# Patient Record
Sex: MALE | Race: BLACK OR AFRICAN AMERICAN | Employment: FULL TIME | URBAN - METROPOLITAN AREA
[De-identification: names, ages, dates, MRNs, and addresses within clinical notes are randomized per-mention and may not be internally consistent; named-entity substitution may affect disease eponyms.]

---

## 2017-11-10 ENCOUNTER — HOSPITAL ENCOUNTER (OUTPATIENT)
Dept: LAB | Age: 52
Discharge: HOME | End: 2017-11-10

## 2017-11-10 PROCEDURE — 88305 TISSUE EXAM BY PATHOLOGIST: CPT | Performed by: INTERNAL MEDICINE

## 2018-08-17 PROBLEM — F17.200 TOBACCO USE DISORDER: Status: ACTIVE | Noted: 2018-08-17

## 2018-08-17 PROBLEM — I25.10 CORONARY ARTERY DISEASE INVOLVING NATIVE CORONARY ARTERY WITHOUT ANGINA PECTORIS: Status: ACTIVE | Noted: 2018-08-17

## 2018-10-17 PROBLEM — F17.200 TOBACCO USE DISORDER: Status: RESOLVED | Noted: 2018-08-17 | Resolved: 2018-10-17

## 2018-10-17 PROBLEM — F17.210 CIGARETTE SMOKER: Status: ACTIVE | Noted: 2018-10-17

## 2019-01-24 ENCOUNTER — HOSPITAL ENCOUNTER (OUTPATIENT)
Dept: ULTRASOUND IMAGING | Age: 54
Discharge: HOME | End: 2019-01-24
Payer: COMMERCIAL

## 2019-01-24 DIAGNOSIS — R22.42 LOCALIZED SWELLING, MASS AND LUMP, LOWER LIMB, LEFT: ICD-10-CM

## 2019-01-24 PROCEDURE — 93971 EXTREMITY STUDY: CPT

## 2019-05-01 ENCOUNTER — HOSPITAL ENCOUNTER (OUTPATIENT)
Dept: GENERAL RADIOLOGY | Age: 54
Discharge: HOME | End: 2019-05-01
Attending: INTERNAL MEDICINE
Payer: COMMERCIAL

## 2019-05-01 DIAGNOSIS — T81.506S: ICD-10-CM

## 2019-05-01 PROCEDURE — 73090 X-RAY EXAM OF FOREARM: CPT

## 2019-05-08 ENCOUNTER — ANESTHESIA EVENT (OUTPATIENT)
Dept: SURGERY | Age: 54
End: 2019-05-08
Payer: COMMERCIAL

## 2019-05-08 NOTE — H&P
Javier Bottom 1965 
male CC: New patient referred for left hand concerns (forearm foreign body). 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 The symptoms started severeal week ago when he noticed that a foreign body (hammer metal chip) which he's had in his forearm since 2983 had become more superficial and started causing him pain. He reports that he never had problems with this foreign body but over the past 6 weeks, whenever he rotates his arm, the foreign body moves and causes radiating pain distally into the thumb and proximally into the forearm. Evaluation to date has included XRs. Treatment to date has included nothing. The symptoms are improved by rest and exacerbated by any forearm rotation and thumb motion. The current symptoms are rated a 5/10 and interfere with work and ADLs, sometimes it wakes him up at night. ? 
Past Medical and Surgical, Family and Social History: This has been reviewed and documented on the patient intake form. See scanned documents for further information. Medications: referenced in chart Allergies: NKDA All medical history, medications and allergies have been discussed with the patient today. ROS: 
This has been reviewed and documented on the patient intake form. See scanned documents for further information. Physical Examination: Patient is a healthy appearing male in no acute distress. Bilateral upper limbs have equal and intact peripheral pulses. Skin does not demonstrate any rashes or lesions. Well healed scar over the radial side of the mid forearm. Palpable foreign body, this is mobile and causes pain radiating into the thumb when it is moved.  Positive Tinel near the foreign body which radiates into the thumb. ? Imaging: Retained metal foreign body about 4mm in size over the radial side of the forearm ? Assessment: S50.852A Superficial foreign body of left forearm, initial encounter Plan: We discussed the diagnosis and different treatment options. We discussed that this may get better with time and he can give it more time. He expressed frustration that it has been several weeks and this interferes with his work because he has to do twisting motions at work which cause severe shooting pain into the thumb and proximally. We also discussed surgical removal with possible radial neurolysis or scar release if this is the case. After thorough discussion, the patient elects to proceed with LEFT FOREARM FOREIGN BODY REMOVAL AND RADIAL NEUROLYSIS. Patient voiced accordance and understanding of the information provided and the formulated plan. All questions were answered to the patient's satisfaction during the encounter. ????? Patient understands risks and benefits of LEFT FOREARM FOREIGN BODY REMOVAL AND RADIAL NEUROLYSIS including but not limited to nerve injury, vessel injury, infection, failure to achieve desired results and possible need for additional surgery. Patient understands and wishes to proceed with surgery. On Exam:  
The patient is alert and oriented; ;  
Lung auscultation is clear bilaterally Heart has RRR without murmurs Marla Giron MD 
05/08/19 
7:00 PM

## 2019-05-09 ENCOUNTER — ANESTHESIA (OUTPATIENT)
Dept: SURGERY | Age: 54
End: 2019-05-09
Payer: COMMERCIAL

## 2019-05-09 ENCOUNTER — APPOINTMENT (OUTPATIENT)
Dept: GENERAL RADIOLOGY | Age: 54
End: 2019-05-09
Attending: ORTHOPAEDIC SURGERY
Payer: COMMERCIAL

## 2019-05-09 ENCOUNTER — HOSPITAL ENCOUNTER (OUTPATIENT)
Age: 54
Setting detail: OUTPATIENT SURGERY
Discharge: HOME | End: 2019-05-09
Attending: ORTHOPAEDIC SURGERY | Admitting: ORTHOPAEDIC SURGERY
Payer: COMMERCIAL

## 2019-05-09 VITALS
OXYGEN SATURATION: 100 % | HEIGHT: 70 IN | DIASTOLIC BLOOD PRESSURE: 85 MMHG | TEMPERATURE: 98 F | RESPIRATION RATE: 16 BRPM | HEART RATE: 48 BPM | SYSTOLIC BLOOD PRESSURE: 126 MMHG | WEIGHT: 182 LBS | BODY MASS INDEX: 26.05 KG/M2

## 2019-05-09 LAB — POTASSIUM BLD-SCNC: 3.8 MMOL/L (ref 3.5–5.1)

## 2019-05-09 PROCEDURE — 77030031139 HC SUT VCRL2 J&J -A: Performed by: ORTHOPAEDIC SURGERY

## 2019-05-09 PROCEDURE — 74011250636 HC RX REV CODE- 250/636

## 2019-05-09 PROCEDURE — 77030002933 HC SUT MCRYL J&J -A: Performed by: ORTHOPAEDIC SURGERY

## 2019-05-09 PROCEDURE — 77030000032 HC CUF TRNQT ZIMM -B: Performed by: ORTHOPAEDIC SURGERY

## 2019-05-09 PROCEDURE — 74011250637 HC RX REV CODE- 250/637: Performed by: ANESTHESIOLOGY

## 2019-05-09 PROCEDURE — 74011250636 HC RX REV CODE- 250/636: Performed by: ANESTHESIOLOGY

## 2019-05-09 PROCEDURE — 74011250636 HC RX REV CODE- 250/636: Performed by: ORTHOPAEDIC SURGERY

## 2019-05-09 PROCEDURE — 74011000250 HC RX REV CODE- 250: Performed by: ORTHOPAEDIC SURGERY

## 2019-05-09 PROCEDURE — 73090 X-RAY EXAM OF FOREARM: CPT

## 2019-05-09 PROCEDURE — 84132 ASSAY OF SERUM POTASSIUM: CPT

## 2019-05-09 PROCEDURE — 76210000063 HC OR PH I REC FIRST 0.5 HR: Performed by: ORTHOPAEDIC SURGERY

## 2019-05-09 PROCEDURE — 77030039266 HC ADH SKN EXOFIN S2SG -A: Performed by: ORTHOPAEDIC SURGERY

## 2019-05-09 PROCEDURE — 76060000032 HC ANESTHESIA 0.5 TO 1 HR: Performed by: ORTHOPAEDIC SURGERY

## 2019-05-09 PROCEDURE — 76210000020 HC REC RM PH II FIRST 0.5 HR: Performed by: ORTHOPAEDIC SURGERY

## 2019-05-09 PROCEDURE — 76010000138 HC OR TIME 0.5 TO 1 HR: Performed by: ORTHOPAEDIC SURGERY

## 2019-05-09 PROCEDURE — 77030018836 HC SOL IRR NACL ICUM -A: Performed by: ORTHOPAEDIC SURGERY

## 2019-05-09 PROCEDURE — 77030010509 HC AIRWY LMA MSK TELE -A: Performed by: REGISTERED NURSE

## 2019-05-09 RX ORDER — PROPOFOL 10 MG/ML
INJECTION, EMULSION INTRAVENOUS AS NEEDED
Status: DISCONTINUED | OUTPATIENT
Start: 2019-05-09 | End: 2019-05-09 | Stop reason: HOSPADM

## 2019-05-09 RX ORDER — MIDAZOLAM HYDROCHLORIDE 1 MG/ML
2 INJECTION, SOLUTION INTRAMUSCULAR; INTRAVENOUS
Status: DISCONTINUED | OUTPATIENT
Start: 2019-05-09 | End: 2019-05-09 | Stop reason: HOSPADM

## 2019-05-09 RX ORDER — KETOROLAC TROMETHAMINE 30 MG/ML
INJECTION, SOLUTION INTRAMUSCULAR; INTRAVENOUS AS NEEDED
Status: DISCONTINUED | OUTPATIENT
Start: 2019-05-09 | End: 2019-05-09 | Stop reason: HOSPADM

## 2019-05-09 RX ORDER — SODIUM CHLORIDE 0.9 % (FLUSH) 0.9 %
5-40 SYRINGE (ML) INJECTION AS NEEDED
Status: DISCONTINUED | OUTPATIENT
Start: 2019-05-09 | End: 2019-05-09 | Stop reason: HOSPADM

## 2019-05-09 RX ORDER — CEFAZOLIN SODIUM/WATER 2 G/20 ML
2 SYRINGE (ML) INTRAVENOUS ONCE
Status: COMPLETED | OUTPATIENT
Start: 2019-05-09 | End: 2019-05-09

## 2019-05-09 RX ORDER — LIDOCAINE HYDROCHLORIDE 10 MG/ML
INJECTION INFILTRATION; PERINEURAL AS NEEDED
Status: DISCONTINUED | OUTPATIENT
Start: 2019-05-09 | End: 2019-05-09 | Stop reason: HOSPADM

## 2019-05-09 RX ORDER — HYDROMORPHONE HYDROCHLORIDE 2 MG/ML
0.5 INJECTION, SOLUTION INTRAMUSCULAR; INTRAVENOUS; SUBCUTANEOUS
Status: DISCONTINUED | OUTPATIENT
Start: 2019-05-09 | End: 2019-05-09 | Stop reason: HOSPADM

## 2019-05-09 RX ORDER — SODIUM CHLORIDE, SODIUM LACTATE, POTASSIUM CHLORIDE, CALCIUM CHLORIDE 600; 310; 30; 20 MG/100ML; MG/100ML; MG/100ML; MG/100ML
INJECTION, SOLUTION INTRAVENOUS
Status: DISCONTINUED | OUTPATIENT
Start: 2019-05-09 | End: 2019-05-09 | Stop reason: HOSPADM

## 2019-05-09 RX ORDER — ALBUTEROL SULFATE 0.83 MG/ML
2.5 SOLUTION RESPIRATORY (INHALATION) AS NEEDED
Status: DISCONTINUED | OUTPATIENT
Start: 2019-05-09 | End: 2019-05-09 | Stop reason: HOSPADM

## 2019-05-09 RX ORDER — PROPOFOL 10 MG/ML
INJECTION, EMULSION INTRAVENOUS
Status: DISCONTINUED | OUTPATIENT
Start: 2019-05-09 | End: 2019-05-09 | Stop reason: HOSPADM

## 2019-05-09 RX ORDER — LIDOCAINE HYDROCHLORIDE 10 MG/ML
0.1 INJECTION INFILTRATION; PERINEURAL AS NEEDED
Status: DISCONTINUED | OUTPATIENT
Start: 2019-05-09 | End: 2019-05-09 | Stop reason: HOSPADM

## 2019-05-09 RX ORDER — SODIUM CHLORIDE, SODIUM LACTATE, POTASSIUM CHLORIDE, CALCIUM CHLORIDE 600; 310; 30; 20 MG/100ML; MG/100ML; MG/100ML; MG/100ML
1000 INJECTION, SOLUTION INTRAVENOUS CONTINUOUS
Status: DISCONTINUED | OUTPATIENT
Start: 2019-05-09 | End: 2019-05-09 | Stop reason: HOSPADM

## 2019-05-09 RX ORDER — ONDANSETRON 2 MG/ML
4 INJECTION INTRAMUSCULAR; INTRAVENOUS
Status: DISCONTINUED | OUTPATIENT
Start: 2019-05-09 | End: 2019-05-09 | Stop reason: HOSPADM

## 2019-05-09 RX ORDER — SODIUM CHLORIDE 0.9 % (FLUSH) 0.9 %
5-40 SYRINGE (ML) INJECTION EVERY 8 HOURS
Status: DISCONTINUED | OUTPATIENT
Start: 2019-05-09 | End: 2019-05-09 | Stop reason: HOSPADM

## 2019-05-09 RX ORDER — ACETAMINOPHEN 500 MG
1000 TABLET ORAL ONCE
Status: COMPLETED | OUTPATIENT
Start: 2019-05-09 | End: 2019-05-09

## 2019-05-09 RX ORDER — BUPIVACAINE HYDROCHLORIDE AND EPINEPHRINE 2.5; 5 MG/ML; UG/ML
INJECTION, SOLUTION EPIDURAL; INFILTRATION; INTRACAUDAL; PERINEURAL AS NEEDED
Status: DISCONTINUED | OUTPATIENT
Start: 2019-05-09 | End: 2019-05-09 | Stop reason: HOSPADM

## 2019-05-09 RX ADMIN — PROPOFOL 100 MCG/KG/MIN: 10 INJECTION, EMULSION INTRAVENOUS at 08:32

## 2019-05-09 RX ADMIN — SODIUM CHLORIDE, SODIUM LACTATE, POTASSIUM CHLORIDE, CALCIUM CHLORIDE: 600; 310; 30; 20 INJECTION, SOLUTION INTRAVENOUS at 08:27

## 2019-05-09 RX ADMIN — SODIUM CHLORIDE, SODIUM LACTATE, POTASSIUM CHLORIDE, AND CALCIUM CHLORIDE 1000 ML: 600; 310; 30; 20 INJECTION, SOLUTION INTRAVENOUS at 07:45

## 2019-05-09 RX ADMIN — ACETAMINOPHEN 1000 MG: 500 TABLET, FILM COATED ORAL at 07:49

## 2019-05-09 RX ADMIN — PROPOFOL 50 MG: 10 INJECTION, EMULSION INTRAVENOUS at 08:39

## 2019-05-09 RX ADMIN — Medication 2 G: at 08:32

## 2019-05-09 RX ADMIN — KETOROLAC TROMETHAMINE 15 MG: 30 INJECTION, SOLUTION INTRAMUSCULAR; INTRAVENOUS at 08:50

## 2019-05-09 NOTE — ANESTHESIA POSTPROCEDURE EVALUATION
Procedure(s): LEFT FOREARM FOREIGN BODY EXCISIONAL RADIAL PLEXUS. total IV anesthesia Anesthesia Post Evaluation Multimodal analgesia: multimodal analgesia used between 6 hours prior to anesthesia start to PACU discharge Patient location during evaluation: bedside Patient participation: complete - patient participated Level of consciousness: awake and responsive to light touch Pain management: adequate Airway patency: patent Anesthetic complications: no 
Cardiovascular status: acceptable, hemodynamically stable, blood pressure returned to baseline and stable Respiratory status: acceptable, unassisted, spontaneous ventilation and nonlabored ventilation Hydration status: acceptable Post anesthesia nausea and vomiting:  controlled Vitals Value Taken Time /67 5/9/2019  9:13 AM  
Temp 36.4 °C (97.5 °F) 5/9/2019  9:04 AM  
Pulse 61 5/9/2019  9:17 AM  
Resp 12 5/9/2019  9:04 AM  
SpO2 96 % 5/9/2019  9:17 AM  
Vitals shown include unvalidated device data.

## 2019-05-09 NOTE — OP NOTES
ORTHOPAEDIC SURGICAL NOTE        Herbert Magaña male 52 y.o.  [id number]   5/9/2019     PRE-OP DIAGNOSIS: Superficial foreign body of left forearm, initial encounter [S50.852A]   POST-OP DIAGNOSIS: Superficial foreign body of left forearm, initial encounter Deniz Flood   LATERALITY: Left     PROCEDURES PERFORMED:   Removal of deep foreign body on the left forearm and radial nerve inspection ([id number])        SURGEON:   Gerhardt Dixie, MD, PhD     IMPLANTS:   * No implants in log *   Procedure(s):  LEFT FOREARM FOREIGN BODY EXCISIONAL RADIAL PLEXUS   Surgeon(s):  Vira Adame MD   Procedure(s):  LEFT FOREARM FOREIGN BODY EXCISIONAL RADIAL PLEXUS     ANESTHESIA: Local +MAC    STAFF:    Circ-1: Bonny Lynne, RN  Scrub Tech-1: Violette Cast     ESTIMATED BLOOD LOSS: None       TOTAL IV FLUIDS : See anesthesia note    COMPLICATIONS: None     DRAINS:       TOURNIQUET TIME:   Total Tourniquet Time Documented:  Upper Arm (Left) - 13 minutes  Total: Upper Arm (Left) - 13 minutes       INDICATION FOR PROCEDURE:     Herbert Magaña sustained the above mentioned injuries as a result of a hammer chip that got stuck in his forearm several years ago. He recently developed pain with forearm rotation that radiated to the thumb. Surgical and non-surgical treatment options were discussed with the patient and their family, as well as the risk and benefits of each option. After thorough discussion, the patient decided to proceed with surgical management. Specific to this treatment plan, we discussed in detail surgical risks including scar, pain, bleeding, infection, anesthetic risks, neurovascular injury, failure to achieve desired results, hardware problems, need for further surgery,  weakness, stiffness, risk of death and potential risk of other unforseen complication. The patient consented to the procedure after discussion of the risks and benefits.          DESCRIPTION OF PROCEDURE:     The patient was identified in the holding room. The Left arm was marked and confirmed as the correct operative site. They were then brought to the OR and general anesthesia was induced. They were transferred onto the OR table in the supine position. All bony prominences were well padded. SCDs were placed on the bilateral legs throughout the case. A timeout was performed, verifying the correct patient, the correct side being the Left side and the correct procedure. Antibiotics were then administered, and were redosed during the procedure as needed at indicated intervals. A non-sterile tourniquet was placed on the left arm. The Left upper extremity was pre scrubbed and then prepped and draped in routine sterile fashion. An incisional timeout was performed re-confirming the correct patient, surgical site and procedure, as well as verifying antibiotics. A stright incision was made after the foreign body was localized on fluoroscopy. Blunt dissection was carried down protecting superficial nerves. The foreign body was identified and excised along with its capsule. Deeper dissection allowed identification of the radial sensory nerve was was deep to the foreign body but it was clear that the foreign body was coming in contact with it upon forearm rotation. The nerve was dissected distally and proximally for about 4cms and there were no injuries. The tourniquet was deflated and hemostasis was ensured. The wounds were then thoroughly irrigated and closed in layered fashion with 3-0 Vicryl, skin glue and steristrips. A sterile dressing was applied followed by a  compressive dressing     The patient was awakened and taken to PACU in stable condition. All sponge and needle counts were correct at the end of the case. I was present and scrubbed for the entire procedure. DISPOSITION:    The patient will be discharged home.      Weightbearing status: No heavy lifting for 2 weeks       CHEMICAL VTE (DVT) PROPHYLAXIS: None warranted for this case     FOLLOW-UP:  10-14 days for suture removal.     Karine Pond MD    05/09/19  9:46 AM

## 2019-05-09 NOTE — DISCHARGE INSTRUCTIONS
Postoperative  Instructions:      Weightbearing or Lifting:  Limit  weight  lifting  to  less  than  2  pounds  (coffee  mug)  for  the  first  2  weeks  after  surgery. Dressing  instructions:    Keep  your  dressing  and/or  splint  clean  and  dry  at  all  times. You  can  remove  your  dressing  on  post-operative  day  #5  and  change  with  a  dry/sterile  dressing  or  Band-Aids  as  needed  thereafter. Showering  Instructions:  May  shower  But keep surgical dressing clean and dry until removed as explained above. After dressing is removed, you may allows soapy water to run through the incision during showers but do not scrub. After each shower, pat dry and apply a dry dressing. Do  not  soak  your  Incision in still water or bathtub  for  3  weeks  after  surgery. If  the  incision  gets  wet otherwise,  pat  dry  and  do  not  scrub  the  incision. Do  not  apply  cream  or  lotion  to  incision      Pain  Control:  - You  have  been  given  a  prescription  to  be  taken  as  directed  for  post-operative  pain  control. In  addition,  elevate  the  operative  extremity  above  the  heart  at  all  times  to  prevent  swelling  and  throbbing  pain. - If you develop constipation while taking narcotic pain medications (Norco, Hydrocodone, Percocet, Oxycodone, Dilaudid, Hydromorphone) take  over-the-counter  Colace,  100mg  by  mouth  twice  a  Day. - Nausea  is  a  common  side  effect  of  many  pain  medications. You  will  want  to  eat something  before  taking  your  pain  medicine  to  help  prevent  Nausea. - If  you  are  taking  a  prescription  pain  medication  that  contains  acetaminophen,  we  recommend  that  you  do  not  take  additional  over  the  counter  acetaminophen  (Tylenol®).       Other  pain  relieving  options:   - Using  a  cold  pack  to  ice  the  affected  area  a  few  times  a  day  (15  to  20  minutes  at  a  time)  can  help  to  relieve pain,  reduce  swelling  and  bruising.      - Elevation  of  the  affected  area  can  also  help  to  reduce  pain  and  swelling. Did  you  receive  a  nerve  Block? A  nerve  block  can  provide  pain  relief  for  one  hour  to  two  days  after  your  surgery. As  long  as  the  nerve  block  is  working,  you  will  experience  little  or  no  sensation  in  the  area  the  surgeon  operated  on. As  the  nerve  block  wears  off,  you  will  begin  to  experience  pain  or  discomfort. It  is  very  important  that  you  begin  taking  your  prescribed  pain  medication  before  the  nerve  block  fully  wears  off. The first sign that the nerve block is wearing off is tingling in your fingers. Treating  your  pain  at  the  first  sign  of  the  block  wearing  off  will  ensure  your  pain  is  better  controlled  and  more  tolerable  when  full-sensation  returns. Do  not  wait  until  the  pain  is  intolerable,  as  the  medicine  will  be  less  effective. It  is  better  to  treat  pain  in  advance  than  to  try  and  catch  up. General  Anesthesia or Sedation:      If  you  did  not  receive  a  nerve  block  during  your  surgery,  you  will  need  to  start  taking  your  pain  medication  shortly  after  your  surgery  and  should  continue  to  do  so  as  prescribed  by  your  surgeon. Please  call  [phone]  with any concern and ask to speak with Pj Connolly. Concerning problems include:      -  Excessive  redness  of  the  incisions      -  Drainage  for  more  than  2  Days after surgery or any foul smelling drainage  -  Fever  of  more  than  101.5  F      Please  call  [phone]  if  you  do  not  receive  or  are  unsure  of  your  first  follow-up  appointment. You  should  see  the  doctor  10-14  days  after  your  Surgery. Thank you for choosing me and [address] [place] [address] for your care.  I will go above and beyond to ensure you receive the best care possible. Kaelyn Garcia MD, PhD        DIET  · Clear liquids until no nausea or vomiting; then light diet for the first day. · Advance to regular diet on second day, unless your doctor orders otherwise. · If nausea and vomiting continues, call your doctor. PAIN  · Take pain medication as directed by your doctor. · Call your doctor if pain is NOT relieved by medication. · DO NOT take aspirin of blood thinners unless directed by your doctor. CALL YOUR DOCTOR IF   · Excessive bleeding that does not stop after holding pressure over the area  · Temperature of 101 degrees F or above  · Excessive redness, swelling or bruising, and/ or green or yellow, smelly discharge from incision    AFTER ANESTHESIA   · For the first 24 hours: DO NOT Drive, Drink alcoholic beverages, or Make important decisions. · Be aware of dizziness following anesthesia and while taking pain medication. After general anesthesia or intravenous sedation, for 24 hours or while taking prescription Narcotics:  · Limit your activities  · A responsible adult needs to be with you for the next 24 hours  · Do not drive and operate hazardous machinery  · Do not make important personal or business decisions  · Do  not drink alcoholic beverages  · If you have not urinated within 8 hours after discharge, please contact your surgeon on call. *  Please give a list of your current medications to your Primary Care Provider. *  Please update this list whenever your medications are discontinued, doses are      changed, or new medications (including over-the-counter products) are added. *  Please carry medication information at all times in case of emergency situations. These are general instructions for a healthy lifestyle:  No smoking/ No tobacco products/ Avoid exposure to second hand smoke  Surgeon General's Warning:  Quitting smoking now greatly reduces serious risk to your health.   Obesity, smoking, and sedentary lifestyle greatly increases your risk for illness  A healthy diet, regular physical exercise & weight monitoring are important for maintaining a healthy lifestyle    You may be retaining fluid if you have a history of heart failure or if you experience any of the following symptoms:  Weight gain of 3 pounds or more overnight or 5 pounds in a week, increased swelling in our hands or feet or shortness of breath while lying flat in bed. Please call your doctor as soon as you notice any of these symptoms; do not wait until your next office visit. Recognize signs and symptoms of STROKE:  F-face looks uneven  A-arms unable to move or move unevenly  S-speech slurred or non-existent  T-time-call 911 as soon as signs and symptoms begin-DO NOT go       Back to bed or wait to see if you get better-TIME IS BRAIN.

## 2019-05-09 NOTE — H&P
H&P Update: 
Ponce Him was seen and examined. History and physical has been reviewed. The patient has been examined. There have been no significant clinical changes since the completion of the originally dated History and Sorin Hernadez MD, PhD 
Orthopaedic Surgery 05/09/19 
8:01 AM

## 2019-05-09 NOTE — ANESTHESIA PREPROCEDURE EVALUATION
Relevant Problems No relevant active problems Anesthetic History No history of anesthetic complications Review of Systems / Medical History Patient summary reviewed and pertinent labs reviewed Pulmonary Smoker Neuro/Psych Within defined limits Cardiovascular Hypertension Hyperlipidemia Exercise tolerance: >4 METS 
  
GI/Hepatic/Renal 
  
GERD Endo/Other Arthritis Other Findings Physical Exam 
 
Airway Mallampati: II 
TM Distance: 4 - 6 cm Neck ROM: normal range of motion Cardiovascular Rhythm: regular Rate: normal 
 
 
Pertinent negatives: No murmur and peripheral edema Dental 
 
Dentition: Full lower dentures Pulmonary Breath sounds clear to auscultation Abdominal 
 
 
 
 Other Findings Anesthetic Plan ASA: 2 Anesthesia type: total IV anesthesia Induction: Intravenous Anesthetic plan and risks discussed with: Patient

## 2019-10-24 ENCOUNTER — HOSPITAL ENCOUNTER (OUTPATIENT)
Dept: GENERAL RADIOLOGY | Age: 54
Discharge: HOME | End: 2019-10-24

## 2019-10-24 DIAGNOSIS — R06.00 DYSPNEA, UNSPECIFIED TYPE: ICD-10-CM

## 2020-01-23 PROBLEM — R20.0 NUMBNESS AND TINGLING IN LEFT ARM: Status: ACTIVE | Noted: 2020-01-23

## 2020-01-23 PROBLEM — R20.2 NUMBNESS AND TINGLING IN LEFT ARM: Status: ACTIVE | Noted: 2020-01-23

## 2020-04-02 PROBLEM — R05.9 COUGH: Status: RESOLVED | Noted: 2020-04-02 | Resolved: 2020-04-02

## 2020-04-02 PROBLEM — R05.9 COUGH: Status: ACTIVE | Noted: 2020-04-02

## (undated) DEVICE — SURGICAL PROCEDURE PACK BASIC ST FRANCIS

## (undated) DEVICE — APPLICATOR BNDG 1MM ADH PREMIERPRO EXOFIN

## (undated) DEVICE — DISPOSABLE BIPOLAR CODE, 12' (3.66 M): Brand: CONMED

## (undated) DEVICE — SUTURE MCRYL SZ 3-0 L27IN ABSRB UD L19MM PS-2 3/8 CIR PRIM Y427H

## (undated) DEVICE — BANDAGE COMPR 9 FTX4 IN SMOOTH COMFORTABLE SYNTH ESMRK LF

## (undated) DEVICE — OCCLUSIVE GAUZE STRIP,3% BISMUTH TRIBROMOPHENATE IN PETROLATUM BLEND: Brand: XEROFORM

## (undated) DEVICE — SOLUTION IV 1000ML 0.9% SOD CHL

## (undated) DEVICE — (D)STRIP SKN CLSR 0.5X4IN WHT --

## (undated) DEVICE — SYR 10ML LUER LOK 1/5ML GRAD --

## (undated) DEVICE — NEEDLE HYPO 18GA L1.5IN PNK S STL HUB POLYPR SHLD REG BVL

## (undated) DEVICE — DRAPE,HAND,STERILE: Brand: MEDLINE

## (undated) DEVICE — ZIMMER® STERILE DISPOSABLE TOURNIQUET CUFF WITH PLC, DUAL PORT, SINGLE BLADDER, 18 IN. (46 CM)

## (undated) DEVICE — PADDING CAST COHESIVE 4 YDX3 IN HND TEARABLE COTTON SPEC 100

## (undated) DEVICE — SUTURE VCRL SZ 3-0 L27IN ABSRB UD L26MM SH 1/2 CIR J416H

## (undated) DEVICE — DRAPE, FILM SHEET, 44X65 STERILE: Brand: MEDLINE

## (undated) DEVICE — STERILE HOOK LOCK LATEX FREE ELASTIC BANDAGE 3INX5YD: Brand: HOOK LOCK™

## (undated) DEVICE — DRAPE XR C ARM 41X74IN LF --

## (undated) DEVICE — 2000CC GUARDIAN II: Brand: GUARDIAN